# Patient Record
Sex: MALE | Race: WHITE | NOT HISPANIC OR LATINO | URBAN - METROPOLITAN AREA
[De-identification: names, ages, dates, MRNs, and addresses within clinical notes are randomized per-mention and may not be internally consistent; named-entity substitution may affect disease eponyms.]

---

## 2019-06-06 ENCOUNTER — OFFICE VISIT - HEALTHEAST (OUTPATIENT)
Dept: PODIATRY | Facility: CLINIC | Age: 54
End: 2019-06-06

## 2019-06-06 ENCOUNTER — RECORDS - HEALTHEAST (OUTPATIENT)
Dept: GENERAL RADIOLOGY | Facility: CLINIC | Age: 54
End: 2019-06-06

## 2019-06-06 DIAGNOSIS — M20.5X2 ACQUIRED HALLUX LIMITUS OF BOTH FEET: ICD-10-CM

## 2019-06-06 DIAGNOSIS — M20.5X1 ACQUIRED HALLUX LIMITUS OF BOTH FEET: ICD-10-CM

## 2019-06-06 DIAGNOSIS — M79.672 PAIN IN BOTH FEET: ICD-10-CM

## 2019-06-06 DIAGNOSIS — M79.671 PAIN IN RIGHT FOOT: ICD-10-CM

## 2019-06-06 DIAGNOSIS — M79.672 PAIN IN LEFT FOOT: ICD-10-CM

## 2019-06-06 DIAGNOSIS — M79.671 PAIN IN BOTH FEET: ICD-10-CM

## 2019-06-06 RX ORDER — METHYLPREDNISOLONE 4 MG
500 TABLET, DOSE PACK ORAL
Status: SHIPPED | COMMUNITY
Start: 2012-04-12

## 2019-06-06 RX ORDER — PSEUDOEPHEDRINE HCL 30 MG
30 TABLET ORAL
Status: SHIPPED | COMMUNITY
Start: 2013-05-17

## 2019-06-06 RX ORDER — LORATADINE 10 MG/1
10 TABLET ORAL
Status: SHIPPED | COMMUNITY
Start: 2018-09-14

## 2019-06-06 ASSESSMENT — MIFFLIN-ST. JEOR: SCORE: 1696.28

## 2019-06-07 ENCOUNTER — COMMUNICATION - HEALTHEAST (OUTPATIENT)
Dept: VASCULAR SURGERY | Facility: CLINIC | Age: 54
End: 2019-06-07

## 2019-06-07 ENCOUNTER — SURGERY - HEALTHEAST (OUTPATIENT)
Dept: PODIATRY | Facility: CLINIC | Age: 54
End: 2019-06-07

## 2021-05-29 NOTE — PROGRESS NOTES
FOOT AND ANKLE SURGERY/PODIATRY CONSULT NOTE         ASSESSMENT:   Hallux limitus bilateral feet      TREATMENT:  I have recommended a Haas first metatarsal osteotomy both feet to plantarflex and shorten the first metatarsal.  I informed the patient however if the cartilage has been fairly destroyed in the joint I would convert the procedure to a first metatarsal phalangeal joint implant arthroplasty of both feet.       HPI: Bao Steward is a pleasant 53-year-old male who presented to the clinic today complaining of a painful big toe joint both feet.  The patient stated that 12 years ago he was diagnosed with hallux rigidus.  He has always had pain in the big toe joint since that time.  The pain has progressed.  He is finding it difficult to weight-bear and ambulate.  He notices that he has changed his gait to stay away from the painful big toe joint.  He stated that the right foot is more painful than the left foot.  He stated that he is now developing pain underneath the second toe right foot.  The pain is severe.  It is relieved with nonweightbearing.  He has not had any associated redness or swelling surrounding the big toe joint both feet.  He denies any other previous treatment.      History reviewed. No pertinent past medical history.    History reviewed. No pertinent surgical history.    No Known Allergies      Current Outpatient Medications:      glucosamine sulfate 500 mg Tab, Take 500 mg by mouth., Disp: , Rfl:      loratadine (CLARITIN) 10 mg tablet, Take 10 mg by mouth., Disp: , Rfl:      pseudoephedrine (SUDAFED) 30 MG tablet, Take 30 mg by mouth., Disp: , Rfl:     History reviewed. No pertinent family history.    Social History     Socioeconomic History     Marital status:      Spouse name: Not on file     Number of children: Not on file     Years of education: Not on file     Highest education level: Not on file   Occupational History     Not on file   Social Needs     Financial resource  strain: Not on file     Food insecurity:     Worry: Not on file     Inability: Not on file     Transportation needs:     Medical: Not on file     Non-medical: Not on file   Tobacco Use     Smoking status: Never Smoker     Smokeless tobacco: Never Used   Substance and Sexual Activity     Alcohol use: Not on file     Drug use: Not on file     Sexual activity: Not on file   Lifestyle     Physical activity:     Days per week: Not on file     Minutes per session: Not on file     Stress: Not on file   Relationships     Social connections:     Talks on phone: Not on file     Gets together: Not on file     Attends Alevism service: Not on file     Active member of club or organization: Not on file     Attends meetings of clubs or organizations: Not on file     Relationship status: Not on file     Intimate partner violence:     Fear of current or ex partner: Not on file     Emotionally abused: Not on file     Physically abused: Not on file     Forced sexual activity: Not on file   Other Topics Concern     Not on file   Social History Narrative     Not on file       Review of Systems - Patient denies fever, chills, rash, wound, stiffness, limping, numbness, weakness, heart burn, blood in stool, chest pain with activity, calf pain when walking, shortness of breath with activity, chronic cough, easy bleeding/bruising, swelling of ankles, excessive thirst, fatigue, depression, anxiety.  Patient admits to bilateral foot pain x12 years.      OBJECTIVE:  Appearance: alert, well appearing, and in no distress.    Vitals:    06/06/19 0852   BP: 110/54   Pulse: 87   SpO2: 97%       BMI= Body mass index is 25.8 kg/m .    General appearance: Patient is alert and fully cooperative with history & exam.  No sign of distress is noted during the visit.  Psychiatric: Affect is pleasant & appropriate.  Patient appears motivated to improve health.  Respiratory: Breathing is regular & unlabored while sitting.  HEENT: Hearing is intact to spoken  word.  Speech is clear.  No gross evidence of visual impairment that would impact ambulation.    Vascular: Dorsalis pedis and posterior tibial pulses are palpable. There is pedal hair growth bilaterally.  CFT < 3 sec from anterior tibial surface to distal digits bilaterally. There is no appreciable edema noted.  Dermatologic: Turgor and texture are within normal limits. No coloration or temperature changes. No primary or secondary lesions noted.  Neurologic: All epicritic and proprioceptive sensations are grossly intact bilaterally.  Musculoskeletal: All active and passive ankle, subtalar, midtarsal joint range of motion are grossly intact without pain or crepitus, with the exception of first metatarsal phalangeal joint both feet.. Manual muscle strength is within normal limits bilaterally. All dorsiflexors, plantarflexors, invertors, evertors are intact bilaterally. Tenderness present to first metatarsal phalangeal joint bilaterally on palpation. Tenderness to first metatarsal phalangeal joint bilaterally with range of motion. Calf is soft/non-tender without warmth/induration    Imaging:     No results found.       TREATMENT:  X-rays of both feet were taken today.  I informed the patient that he does have significant degenerative changes of the first metatarsal phalangeal joint both feet.  I have recommended a Haas first metatarsal osteotomy of both feet however if the cartilage has been destroyed I will convert the procedure to a first metatarsal phalangeal joint implant arthroplasty of both feet. The patient was told that these procedures are performed on an outpatient basis under local anesthesia with IV sedation.  He was told that both procedures could be done simultaneously.  The patient was told that following the procedures he would be able to weight-bear in a postop shoe for 2 weeks if the arthroplasty is performed however if the Holmanis performed it would require that he is remains nonweightbearing for  6 weeks..  He was asked to go n.p.o. at midnight prior to the procedure and he was asked to see his primary care physician for preoperative consultation.    Rocco Gama; YANA  [facility] Foot & Ankle Surgery/Podiatry

## 2021-06-02 VITALS — BODY MASS INDEX: 25.9 KG/M2 | HEIGHT: 71 IN | WEIGHT: 185 LBS

## 2021-08-22 ENCOUNTER — HEALTH MAINTENANCE LETTER (OUTPATIENT)
Age: 56
End: 2021-08-22

## 2021-10-17 ENCOUNTER — HEALTH MAINTENANCE LETTER (OUTPATIENT)
Age: 56
End: 2021-10-17

## 2022-10-01 ENCOUNTER — HEALTH MAINTENANCE LETTER (OUTPATIENT)
Age: 57
End: 2022-10-01

## 2023-10-21 ENCOUNTER — HEALTH MAINTENANCE LETTER (OUTPATIENT)
Age: 58
End: 2023-10-21